# Patient Record
Sex: MALE | Race: WHITE | NOT HISPANIC OR LATINO | Employment: OTHER | URBAN - METROPOLITAN AREA
[De-identification: names, ages, dates, MRNs, and addresses within clinical notes are randomized per-mention and may not be internally consistent; named-entity substitution may affect disease eponyms.]

---

## 2024-08-27 NOTE — PROGRESS NOTES
" .Subjective   Patient ID: Forrest Mayen \"Medardo\" is a 79 y.o. male who presents for ear cleaning.  HPI  This 79-year-old male last seen in the office in 2022 is being seen in follow-up.  He has a history of sensorineural hearing loss, asymmetric along with symptoms of tinnitus.  The number of years ago an MRI scan was done because of the asymmetry which was negative for central nervous system abnormalities.  This been no signs of infection or of sudden hearing loss.  He is treated for sleep apnea on CPAP.  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    Tinnitus 08/28/2024    Abnormal stress echocardiogram 09/01/2016    Acrochordon 07/22/2024    Acute posthemorrhagic anemia 12/15/2023    Aneurysm of abdominal aorta (CMS-HCC) 04/22/2021    Arthritis 08/28/2024    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    BPH (benign prostatic hyperplasia) 08/28/2024    Bradycardia 12/22/2022    Chronic idiopathic constipation 12/07/2021    Cardiac arrhythmia 12/22/2022    Chronic pain of both knees 04/01/2021    CKD (chronic kidney disease) 08/28/2024    Dyspnea on exertion 09/01/2016    Elevated blood sugar 12/07/2021    Essential hypertension 09/01/2016    Familial hypercholesterolemia 12/07/2021    Gastro-esophageal reflux disease without esophagitis 02/20/2020    Gross hematuria 05/06/2019    Gynecomastia 07/25/2018    Hemangioma 07/22/2024    Elevated lipids 08/28/2024    Hypo-osmolality and hyponatremia 12/15/2023    Hypothyroid " 02/20/2020    Impaired fasting glucose 10/19/2020    Inflamed seborrheic keratosis 07/22/2024    Irritant contact dermatitis 06/18/2024    Kidney stone on right side 04/01/2021    Left ventricular hypertrophy 04/21/2022    Lentigo 07/22/2024    Mobitz type I incomplete atrioventricular block 12/08/2022    Nonrheumatic mitral valve regurgitation 06/21/2021    Obesity, Class I, BMI 30-34.9 07/25/2018    ROXY (obstructive sleep apnea) 07/25/2018    Overactive bladder 02/20/2020    Peripheral vascular disease (CMS-HCC) 10/26/2022    Perivascular dermatitis 06/25/2024    Polyp of ear canal, bilateral 07/08/2021    Postoperative pain 12/16/2023    Presence of left artificial knee joint 12/15/2023    PVC (premature ventricular contraction) 12/08/2022    Sinus bradycardia 01/03/2023    SVT (supraventricular tachycardia) (CMS-HCC) 08/28/2024    Unilateral primary osteoarthritis, left knee 12/15/2023    Xerosis cutis 06/03/2024     Resolved Ambulatory Problems     Diagnosis Date Noted    No Resolved Ambulatory Problems     Past Medical History:   Diagnosis Date    Benign neoplasm of bone and articular cartilage, unspecified     Disorder of thyroid, unspecified     Personal history of other diseases of the circulatory system     Personal history of other diseases of the nervous system and sense organs          Current Outpatient Medications:     amoxicillin (Amoxil) 500 mg capsule, Take 1 capsule (500 mg) by mouth every 8 hours., Disp: , Rfl:     aspirin 81 mg EC tablet, Take 1 tablet (81 mg) by mouth twice a day., Disp: , Rfl:     celecoxib (CeleBREX) 200 mg capsule, Take 1 capsule (200 mg) by mouth once daily., Disp: , Rfl:     cholecalciferol (Vitamin D-3) 50 MCG (2000 UT) tablet, Take 1 tablet (2,000 Units) by mouth once daily., Disp: , Rfl:     clobetasol (Temovate) 0.05 % ointment, APPLY TO AFFECTED AREAS TWICE A DAY FOR two WEEKS, Disp: , Rfl:     cyanocobalamin (Vitamin B-12) 2,000 mcg tablet, Take 1 tablet (2,000 mcg)  "by mouth once daily., Disp: , Rfl:     docusate sodium (Colace) 100 mg capsule, Take 1 capsule (100 mg) by mouth 2 times a day as needed., Disp: , Rfl:     finasteride (Proscar) 5 mg tablet, Take 1 tablet (5 mg) by mouth once daily., Disp: , Rfl:     folic acid (Folvite) 400 mcg tablet, Take by mouth., Disp: , Rfl:     hydrOXYzine HCL (Atarax) 25 mg tablet, Take 1 tablet (25 mg) by mouth., Disp: , Rfl:     levothyroxine (Synthroid, Levoxyl) 100 mcg tablet, Take 1 tablet (100 mcg) by mouth early in the morning.., Disp: , Rfl:     losartan (Cozaar) 50 mg tablet, Take 1 tablet (50 mg) by mouth once daily., Disp: , Rfl:     mirabegron (Myrbetriq) 50 mg tablet extended release 24 hr 24 hr tablet, Take 1 tablet (50 mg) by mouth once daily., Disp: , Rfl:     omeprazole (PriLOSEC) 20 mg DR capsule, Take 1 capsule (20 mg) by mouth once daily in the morning. Take before meals., Disp: , Rfl:     oxybutynin XL (Ditropan-XL) 5 mg 24 hr tablet, Take 1 tablet (5 mg) by mouth once daily at bedtime., Disp: , Rfl:     valsartan-hydrochlorothiazide (Diovan-HCT) 320-12.5 mg tablet, Take 1 tablet by mouth early in the morning.., Disp: , Rfl:     ascorbic acid (Vitamin C) 100 mg tablet, Take 5 tablets (500 mg) by mouth once daily., Disp: , Rfl:     phenazopyridine (Urinary Pain Relief) 95 mg tablet, Take 1 tablet (95 mg) by mouth 3 times daily (morning, midday, late afternoon)., Disp: , Rfl:     polyethylene glycol (Glycolax, Miralax) 17 gram packet, Take 17 g by mouth once daily., Disp: , Rfl:     rosuvastatin (Crestor) 10 mg tablet, Take 1 tablet (10 mg) by mouth once daily., Disp: , Rfl:     sennosides (Senokot) 8.6 mg tablet, Take 1 tablet (8.6 mg) by mouth once daily., Disp: , Rfl:    Social History     Tobacco Use    Smoking status: Never    Smokeless tobacco: Never   Substance Use Topics    Alcohol use: Yes       Allergies   Allergen Reactions    Meloxicam Other     Blood in urine     Blood in urine       Height 1.803 m (5' 11\"), " "weight 114 kg (252 lb).    Objective   Physical Exam  GENERAL DARIEL.EARANCE: Alert, in no acute distress, normal pitch and clarity of voice, well-developed and nourished, cooperative.     HEAD/FACE: Normocephalic, atraumatic, normal facial movements and strength, no no tenderness to palpation, no lesions noted.     SKIN: Normal turgor, no raised or ulcerative lesions, warm and dry to palpation.     EYES: Extraocular motions intact, no nystagmus noted, pupils equal and reactive to light and accommodation, no conjunctivitis.     EARS: Both ears-external ears were negative for skin lesions or abnormalities. On the right-hand side there was surface cerumen around the periphery of the canal removed with a wax loop. Small osteomas noted distally. The tympanic membrane is intact with no abnormalities. Left-hand side revealed similar cerumen buildup which was removed with no signs of acute inflammation. The tympanic membrane is intact.     NOSE: No external skin lesions are noted, nares are patent, septum is intact with mild small vessel noted on the anterior septum on the right There is no evidence of active bleeding however old stains of blood are noted on the turbinates, mid septal deviation is noted to the left       OROPHARYNX/ORAL CAVITY: Oropharynx is not inflamed and is without lesions, mucosa of the oral cavity is intact and without lesions, tongue is midline and mobile, no acute dental disease is noted, TMJs are mobile     NECK: No lymphadenopathy is palpated, carotid pulses are intact, neck is supple with full range of motion, no thyroid abnormalities are noted, trachea is midline, no neck masses are palpated.     LYMPHATICS: No cervical adenopathy or supraclavicular adenopathy is palpated.     NEUROLOGIC/PSYCH; alert and oriented, cranial nerves are grossly intact, gait is without falling, no motor deficits are noted.    Patient ID: Forrest Mayen \"Medardo\" is a 79 y.o. male.    Binocular microscopy    Date/Time: " 8/28/2024 9:04 AM    Performed by: Valentin Gilbert DMD, MD  Authorized by: Valentin Gilbert DMD, MD    Consent:     Consent obtained:  Verbal    Consent given by:  Patient    Risks discussed:  Pain    Alternatives discussed:  No treatment and observation  Post-procedure details:     Patient tolerance of procedure:  Tolerated well, no immediate complications  Comments:      Microscopic evaluation of both ears was done.  There was no cerumen obstruction on either side.  Osteomas are noted to on the right 1 on the left consistent with past evaluations.  The tympanic members are normal in appearance    His audiogram today continues to show a sloping mild to severe sensorineural hearing loss in both ears with 84% discrimination on the right 90% on the left at 85 dB presentation levels.  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 Since the last test there has been no significant change in the hearing of individual frequencies sound. Discrimination ability is decreased in both ears compared to past test.. It would be advised that a yearly audiogram be done unless symptoms develop in regards to progressive loss, new onset vertigo, or changes regarding tinnitus. Avoidance of loud noise without ear protection is advised. Rehabilitation using hearing aids is advised.  Since he has hearing aids he needs to wear them which she has not done consistently.  He also needs to see his outside audiologist to have them serviced.  He has no obstruction in the ear canals from cerumen and the osteomas present are not obstructive.  He should have a  yearly follow-up but should be seen during the year if there is any sudden change in hearing or new head neck issues.       Valentin Gilbert DMD, MD 08/28/24 9:05 AM

## 2024-08-28 ENCOUNTER — OFFICE VISIT (OUTPATIENT)
Dept: OTOLARYNGOLOGY | Facility: CLINIC | Age: 79
End: 2024-08-28
Payer: MEDICARE

## 2024-08-28 ENCOUNTER — CLINICAL SUPPORT (OUTPATIENT)
Dept: AUDIOLOGY | Facility: CLINIC | Age: 79
End: 2024-08-28
Payer: MEDICARE

## 2024-08-28 VITALS — WEIGHT: 252 LBS | BODY MASS INDEX: 35.28 KG/M2 | HEIGHT: 71 IN

## 2024-08-28 DIAGNOSIS — H90.3 SENSORINEURAL HEARING LOSS (SNHL) OF BOTH EARS: Primary | ICD-10-CM

## 2024-08-28 DIAGNOSIS — H69.91 DYSFUNCTION OF RIGHT EUSTACHIAN TUBE: ICD-10-CM

## 2024-08-28 DIAGNOSIS — H93.13 TINNITUS OF BOTH EARS: ICD-10-CM

## 2024-08-28 DIAGNOSIS — D16.4 OSTEOMA OF EAR CANAL: ICD-10-CM

## 2024-08-28 DIAGNOSIS — H93.293 AUDITORY DISCRIMINATION IMPAIRMENT, BILATERAL: ICD-10-CM

## 2024-08-28 PROBLEM — E78.01 FAMILIAL HYPERCHOLESTEROLEMIA: Status: ACTIVE | Noted: 2021-12-07

## 2024-08-28 PROBLEM — K22.70 BARRETT'S ESOPHAGUS: Status: ACTIVE | Noted: 2021-04-22

## 2024-08-28 PROBLEM — M19.90 ARTHRITIS: Status: ACTIVE | Noted: 2024-08-28

## 2024-08-28 PROBLEM — I47.10 SVT (SUPRAVENTRICULAR TACHYCARDIA) (CMS-HCC): Status: ACTIVE | Noted: 2024-08-28

## 2024-08-28 PROBLEM — G89.18 POSTOPERATIVE PAIN: Status: ACTIVE | Noted: 2023-12-16

## 2024-08-28 PROBLEM — E78.5 ELEVATED LIPIDS: Status: ACTIVE | Noted: 2024-08-28

## 2024-08-28 PROBLEM — L91.8 ACROCHORDON: Status: ACTIVE | Noted: 2024-07-22

## 2024-08-28 PROBLEM — M17.12 UNILATERAL PRIMARY OSTEOARTHRITIS, LEFT KNEE: Status: ACTIVE | Noted: 2023-12-15

## 2024-08-28 PROBLEM — H61.893: Status: ACTIVE | Noted: 2021-07-08

## 2024-08-28 PROBLEM — N40.0 BPH (BENIGN PROSTATIC HYPERPLASIA): Status: ACTIVE | Noted: 2024-08-28

## 2024-08-28 PROBLEM — R00.1 SINUS BRADYCARDIA: Status: ACTIVE | Noted: 2023-01-03

## 2024-08-28 PROBLEM — G89.29 CHRONIC PAIN OF BOTH KNEES: Status: ACTIVE | Noted: 2021-04-01

## 2024-08-28 PROBLEM — R00.1 BRADYCARDIA: Status: ACTIVE | Noted: 2022-12-22

## 2024-08-28 PROBLEM — I49.3 PVC (PREMATURE VENTRICULAR CONTRACTION): Status: ACTIVE | Noted: 2022-12-08

## 2024-08-28 PROBLEM — L85.3 XEROSIS CUTIS: Status: ACTIVE | Noted: 2024-06-03

## 2024-08-28 PROBLEM — N20.0 KIDNEY STONE ON RIGHT SIDE: Status: ACTIVE | Noted: 2021-04-01

## 2024-08-28 PROBLEM — H61.23 BILATERAL IMPACTED CERUMEN: Status: ACTIVE | Noted: 2024-08-28

## 2024-08-28 PROBLEM — E03.9 HYPOTHYROID: Status: ACTIVE | Noted: 2020-02-20

## 2024-08-28 PROBLEM — M25.561 CHRONIC PAIN OF BOTH KNEES: Status: ACTIVE | Noted: 2021-04-01

## 2024-08-28 PROBLEM — N32.81 OVERACTIVE BLADDER: Status: ACTIVE | Noted: 2020-02-20

## 2024-08-28 PROBLEM — I44.1 MOBITZ TYPE I INCOMPLETE ATRIOVENTRICULAR BLOCK: Status: ACTIVE | Noted: 2022-12-08

## 2024-08-28 PROBLEM — R73.9 ELEVATED BLOOD SUGAR: Status: ACTIVE | Noted: 2021-12-07

## 2024-08-28 PROBLEM — L30.8: Status: ACTIVE | Noted: 2024-06-25

## 2024-08-28 PROBLEM — I73.9 PERIPHERAL VASCULAR DISEASE (CMS-HCC): Status: ACTIVE | Noted: 2022-10-26

## 2024-08-28 PROBLEM — E66.9 OBESITY, CLASS I, BMI 30-34.9: Status: ACTIVE | Noted: 2018-07-25

## 2024-08-28 PROBLEM — H93.19 TINNITUS: Status: ACTIVE | Noted: 2024-08-28

## 2024-08-28 PROBLEM — D62 ACUTE POSTHEMORRHAGIC ANEMIA: Status: ACTIVE | Noted: 2023-12-15

## 2024-08-28 PROBLEM — L81.4 LENTIGO: Status: ACTIVE | Noted: 2024-07-22

## 2024-08-28 PROBLEM — R73.01 IMPAIRED FASTING GLUCOSE: Status: ACTIVE | Noted: 2020-10-19

## 2024-08-28 PROBLEM — E87.1 HYPO-OSMOLALITY AND HYPONATREMIA: Status: ACTIVE | Noted: 2023-12-15

## 2024-08-28 PROBLEM — K59.04 CHRONIC IDIOPATHIC CONSTIPATION: Status: ACTIVE | Noted: 2021-12-07

## 2024-08-28 PROBLEM — I49.9 CARDIAC ARRHYTHMIA: Status: ACTIVE | Noted: 2022-12-22

## 2024-08-28 PROBLEM — H93.291 IMPAIRMENT OF AUDITORY DISCRIMINATION OF RIGHT EAR: Status: ACTIVE | Noted: 2024-08-28

## 2024-08-28 PROBLEM — I34.0 NONRHEUMATIC MITRAL VALVE REGURGITATION: Status: ACTIVE | Noted: 2021-06-21

## 2024-08-28 PROBLEM — K21.9 GASTRO-ESOPHAGEAL REFLUX DISEASE WITHOUT ESOPHAGITIS: Status: ACTIVE | Noted: 2020-02-20

## 2024-08-28 PROBLEM — L82.0 INFLAMED SEBORRHEIC KERATOSIS: Status: ACTIVE | Noted: 2024-07-22

## 2024-08-28 PROBLEM — M25.562 CHRONIC PAIN OF BOTH KNEES: Status: ACTIVE | Noted: 2021-04-01

## 2024-08-28 PROBLEM — D18.00 HEMANGIOMA: Status: ACTIVE | Noted: 2024-07-22

## 2024-08-28 PROBLEM — R04.0 EPISTAXIS, RECURRENT: Status: ACTIVE | Noted: 2024-08-28

## 2024-08-28 PROBLEM — N62 GYNECOMASTIA: Status: ACTIVE | Noted: 2018-07-25

## 2024-08-28 PROBLEM — I71.40 ANEURYSM OF ABDOMINAL AORTA (CMS-HCC): Status: ACTIVE | Noted: 2021-04-22

## 2024-08-28 PROBLEM — N18.9 CKD (CHRONIC KIDNEY DISEASE): Status: ACTIVE | Noted: 2024-08-28

## 2024-08-28 PROBLEM — Z96.652 PRESENCE OF LEFT ARTIFICIAL KNEE JOINT: Status: ACTIVE | Noted: 2023-12-15

## 2024-08-28 PROBLEM — E66.811 OBESITY, CLASS I, BMI 30-34.9: Status: ACTIVE | Noted: 2018-07-25

## 2024-08-28 PROBLEM — I51.7 LEFT VENTRICULAR HYPERTROPHY: Status: ACTIVE | Noted: 2022-04-21

## 2024-08-28 PROBLEM — L24.9 IRRITANT CONTACT DERMATITIS: Status: ACTIVE | Noted: 2024-06-18

## 2024-08-28 PROBLEM — G47.33 OSA (OBSTRUCTIVE SLEEP APNEA): Status: ACTIVE | Noted: 2018-07-25

## 2024-08-28 PROBLEM — R31.0 GROSS HEMATURIA: Status: ACTIVE | Noted: 2019-05-06

## 2024-08-28 PROCEDURE — 1159F MED LIST DOCD IN RCRD: CPT | Performed by: OTOLARYNGOLOGY

## 2024-08-28 PROCEDURE — 1036F TOBACCO NON-USER: CPT | Performed by: OTOLARYNGOLOGY

## 2024-08-28 PROCEDURE — 99214 OFFICE O/P EST MOD 30 MIN: CPT | Performed by: OTOLARYNGOLOGY

## 2024-08-28 PROCEDURE — 1160F RVW MEDS BY RX/DR IN RCRD: CPT | Performed by: OTOLARYNGOLOGY

## 2024-08-28 PROCEDURE — 92557 COMPREHENSIVE HEARING TEST: CPT | Performed by: AUDIOLOGIST

## 2024-08-28 PROCEDURE — 92567 TYMPANOMETRY: CPT | Performed by: AUDIOLOGIST

## 2024-08-28 PROCEDURE — 92504 EAR MICROSCOPY EXAMINATION: CPT | Performed by: OTOLARYNGOLOGY

## 2024-08-28 RX ORDER — CLOBETASOL PROPIONATE 0.5 MG/G
OINTMENT TOPICAL
COMMUNITY
Start: 2024-07-03

## 2024-08-28 RX ORDER — WITCH HAZEL 50 %
2000 PADS, MEDICATED (EA) TOPICAL DAILY
COMMUNITY
Start: 2021-07-09

## 2024-08-28 RX ORDER — CHOLECALCIFEROL (VITAMIN D3) 50 MCG
2000 TABLET ORAL DAILY
COMMUNITY
Start: 2016-09-08

## 2024-08-28 RX ORDER — CELECOXIB 200 MG/1
200 CAPSULE ORAL DAILY
COMMUNITY
Start: 2024-01-27

## 2024-08-28 RX ORDER — HYDROXYZINE HYDROCHLORIDE 25 MG/1
25 TABLET, FILM COATED ORAL
COMMUNITY
Start: 2024-05-28

## 2024-08-28 RX ORDER — POLYETHYLENE GLYCOL 3350 17 G/17G
17 POWDER, FOR SOLUTION ORAL DAILY
COMMUNITY

## 2024-08-28 RX ORDER — OXYBUTYNIN CHLORIDE 5 MG/1
5 TABLET, EXTENDED RELEASE ORAL NIGHTLY
COMMUNITY
Start: 2024-06-25

## 2024-08-28 RX ORDER — DOCUSATE SODIUM 100 MG/1
100 CAPSULE, LIQUID FILLED ORAL 2 TIMES DAILY PRN
COMMUNITY
Start: 2024-01-10

## 2024-08-28 RX ORDER — ROSUVASTATIN CALCIUM 10 MG/1
10 TABLET, COATED ORAL DAILY
COMMUNITY

## 2024-08-28 RX ORDER — MIRABEGRON 50 MG/1
50 TABLET, EXTENDED RELEASE ORAL DAILY
COMMUNITY
Start: 2021-07-09

## 2024-08-28 RX ORDER — VALSARTAN AND HYDROCHLOROTHIAZIDE 320; 12.5 MG/1; MG/1
1 TABLET, FILM COATED ORAL
COMMUNITY
Start: 2024-06-19

## 2024-08-28 RX ORDER — ASPIRIN 81 MG/1
81 TABLET ORAL 2 TIMES DAILY
COMMUNITY
Start: 2023-12-14

## 2024-08-28 RX ORDER — FINASTERIDE 5 MG/1
5 TABLET, FILM COATED ORAL DAILY
COMMUNITY
Start: 2019-06-06

## 2024-08-28 RX ORDER — AMOXICILLIN 500 MG/1
500 CAPSULE ORAL EVERY 8 HOURS SCHEDULED
COMMUNITY
Start: 2024-06-13

## 2024-08-28 RX ORDER — SENNOSIDES 8.6 MG/1
8.6 TABLET ORAL DAILY
COMMUNITY

## 2024-08-28 RX ORDER — PHENAZOPYRIDINE HYDROCHLORIDE 95 MG/1
95 TABLET ORAL
COMMUNITY

## 2024-08-28 RX ORDER — OMEPRAZOLE 20 MG/1
20 CAPSULE, DELAYED RELEASE ORAL
COMMUNITY
Start: 2016-09-08

## 2024-08-28 RX ORDER — LEVOTHYROXINE SODIUM 100 UG/1
1 TABLET ORAL
COMMUNITY
Start: 2024-06-19

## 2024-08-28 RX ORDER — LOSARTAN POTASSIUM 50 MG/1
50 TABLET ORAL DAILY
COMMUNITY
Start: 2021-07-09

## 2024-08-28 RX ORDER — FOLIC ACID 0.4 MG
TABLET ORAL
COMMUNITY
Start: 2015-08-18

## 2024-08-28 NOTE — PROGRESS NOTES
"COMPREHENSIVE AUDIOMETRIC EVALUATION      Name:  Forrest Mayen \"Medardo\"  :  1945  Age:  79 y.o.  Date of Evaluation:  24   Referring Provider:  Valentin Gilbert DMD, MD     History:  Mr. Mayen was seen today for an evaluation of hearing.  Patient reported aural fullness and tinnitus bilaterally.  Please recall, patient has a known bilateral sensorineural hearing loss for which he has hearing aids obtained from an outside provider.  Patient reported that he does not utilize his hearing aids consistently.  He additionally reported medical history significant for osteomas, binaurally.  When asked, patient denied concerns for decreased hearing sensitivity    See audiometric evaluation at end of this report or scanned under media tab    OTOSCOPY:       Right Ear: Clear canal with osteomas visualized       Left Ear: Clear canal with osteomas visualized    226 Hz TYMPANOMETRY:       Right Ear: Type C: Negative pressure with normal compliance and ear canal volume, consistent with eustachian tube dysfunction       Left Ear: Type Ad: hypercompliant with normal peak pressure and ear canal volume    AUDIOMETRIC EVALUATION (Phones):       Right Ear: Mild sloping to Profound Sensorineural hearing loss                 Left Ear: Mild sloping to Profound Sensorineural hearing loss           NOTE: Hearing sensitivity decreased in the right ear at 3000 Hz and in the left ear at 8000 Hz as compared to most recent audiometric evaluation 2022    Test technique:  Standard Audiometry  Reliability:   good    SPEECH RECOGNITION THRESHOLD:       Right Ear:  55 dBHL in poor agreement with PTA       Left Ear:  45 dBHL in good agreement with PTA    WORD RECOGNITION:       Right Ear:  good (84%) at elevated presentation level       Left Ear:  excellent (90%) at elevated presentation level    RECOMMENDATIONS:  -Recommend patient return should concerns for changes in hearing sensitivity arise or as medically indicated.   -Recommend " patient re-establish consistent utilization of hearing aids and follow up with fitting provider.    Hamilton Thrasher, CCC-A     Appt: 8:30 - 9:00 AM

## 2025-09-03 ENCOUNTER — APPOINTMENT (OUTPATIENT)
Dept: AUDIOLOGY | Facility: CLINIC | Age: 80
End: 2025-09-03
Payer: MEDICARE

## 2025-09-03 ENCOUNTER — APPOINTMENT (OUTPATIENT)
Dept: OTOLARYNGOLOGY | Facility: CLINIC | Age: 80
End: 2025-09-03
Payer: MEDICARE

## 2025-12-04 ENCOUNTER — APPOINTMENT (OUTPATIENT)
Dept: OTOLARYNGOLOGY | Facility: CLINIC | Age: 80
End: 2025-12-04
Payer: MEDICARE

## 2025-12-04 ENCOUNTER — APPOINTMENT (OUTPATIENT)
Dept: AUDIOLOGY | Facility: CLINIC | Age: 80
End: 2025-12-04
Payer: MEDICARE